# Patient Record
Sex: FEMALE | Race: BLACK OR AFRICAN AMERICAN | Employment: UNEMPLOYED | URBAN - METROPOLITAN AREA
[De-identification: names, ages, dates, MRNs, and addresses within clinical notes are randomized per-mention and may not be internally consistent; named-entity substitution may affect disease eponyms.]

---

## 2019-10-23 ENCOUNTER — OFFICE VISIT (OUTPATIENT)
Dept: PRIMARY CARE CLINIC | Age: 34
End: 2019-10-23

## 2019-10-23 VITALS
OXYGEN SATURATION: 100 % | HEIGHT: 64 IN | WEIGHT: 160.6 LBS | TEMPERATURE: 98.3 F | SYSTOLIC BLOOD PRESSURE: 106 MMHG | BODY MASS INDEX: 27.42 KG/M2 | DIASTOLIC BLOOD PRESSURE: 71 MMHG | RESPIRATION RATE: 16 BRPM | HEART RATE: 82 BPM

## 2019-10-23 DIAGNOSIS — R63.4 WEIGHT LOSS: ICD-10-CM

## 2019-10-23 DIAGNOSIS — G89.29 CHRONIC ABDOMINAL PAIN: Primary | ICD-10-CM

## 2019-10-23 DIAGNOSIS — R10.9 CHRONIC ABDOMINAL PAIN: Primary | ICD-10-CM

## 2019-10-23 DIAGNOSIS — R19.5 LOOSE STOOLS: ICD-10-CM

## 2019-10-23 DIAGNOSIS — N89.8 VAGINAL PRURITUS: ICD-10-CM

## 2019-10-23 RX ORDER — ALBUTEROL SULFATE 90 UG/1
2 AEROSOL, METERED RESPIRATORY (INHALATION)
COMMUNITY
Start: 2017-10-31

## 2019-10-23 RX ORDER — EPINEPHRINE 0.3 MG/.3ML
0.3 INJECTION SUBCUTANEOUS
COMMUNITY

## 2019-10-23 NOTE — PROGRESS NOTES
Wilmer Jones is a 36 y.o. female    Chief Complaint   Patient presents with   [address] [place] [address]    Vaginal Itching     unsure if she has a yeast infx, noticed it last week     Abdominal Pain     has been having stomach pains, diarrhea, vomiting and constipation, has lost 15lbs in the last 2 weeks, comes and goes x8mos, currently having abdominal pain and diarrhea as of last night, & it's making her anxiety get worse. 1. Have you been to the ER, urgent care clinic since your last visit? Hospitalized since your last visit? No    2. Have you seen or consulted any other health care providers outside of the [address] [place] [address] since your last visit? Include any pap smears or colon screening. No    No flowsheet data found.      Health Maintenance Due   Topic Date Due    DTaP/Tdap/Td series (1 - Tdap) 09/21/2007    PAP AKA CERVICAL CYTOLOGY  09/21/2007    Influenza Age 9 to Adult  08/01/2019

## 2019-10-23 NOTE — PATIENT INSTRUCTIONS

## 2019-10-23 NOTE — PROGRESS NOTES
HPI:     Chief Complaint   Patient presents with   [facility] Establish Care    Vaginal Itching     unsure if she has a yeast infx, noticed it last week     Abdominal Pain     has been having stomach pains, diarrhea, vomiting and constipation, has lost 15lbs in the last 2 weeks, comes and goes x8mos, currently having abdominal pain and diarrhea as of last night, & it's making her anxiety get worse. Patient is a 36 y.o. female with significant history of asthma, PCOS, depression, anxiety who presents as new patient for evaluation of abdominal pain and vaginal pruritus. Patient reports at least 8 month history of intermittent generalized abdominal pain. It is not constant but when it happens it is severe. Will last for a few days then go away. Admits to smoking marijuana daily and this often resolves her abdominal pain. Most recently had episode of abdominal pain 3 weeks ago, but did wake up with pain this morning, 7/10 in intensity. Denies nausea, vomiting, fever, chills. Pain is not associated with menses and is different than pain she has had in the past from PCOS. Pain seems to flare up when she is stressed or anxious. She has bouts of both loose stools and constipation. Reports inconsistently with stools. For instance yesterday she had BM that was hard and difficult to pass, but the day before had very loose stool. Does have BM daily. She denies bloody or dark stools and reports abdominal pain usually gets better after BM. She reports that food seems to run right through her. She will eat then have to run to the bathroom for BM. Due to this she is not eating much, has been eating one meal per day for past 8 months and snacks on crackers and ginger ale. Normal meal is chicken, solo greens, macaroni. She reports 15-20 lbs unintentional weight loss over the last 2 weeks. Denies family history of colorectal cancer or IBD.   Denies any chance of pregnancy, has issues with fertility in the past. LMP 10/13/19. Complains of vaginal itching for the past 3-4 days with clear discharge. Otherwise denies dysuria, dyspareunia, abnormal uterine bleeding. There is no problem list on file for this patient. Current Outpatient Medications   Medication Sig Dispense Refill    albuterol (PROAIR HFA) 90 mcg/actuation inhaler 2 Puffs every six (6) hours as needed.  beclomethasone (QVAR) 40 mcg/actuation aero Take 2 Puffs by inhalation daily as needed.  EPINEPHrine (EPIPEN) 0.3 mg/0.3 mL injection 0.3 mg by IntraMUSCular route once as needed. Allergies   Allergen Reactions    Red Dye Other (comments)     Throat swelling    Bee Venom Protein (Honey Bee) Other (comments)     Tongue swelling     Past Medical History:   Diagnosis Date    Anxiety     Asthma     Depression     PCOS (polycystic ovarian syndrome)      Past Surgical History:   Procedure Laterality Date    HX DILATION AND CURETTAGE      HX WISDOM TEETH EXTRACTION       Family History   Problem Relation Age of Onset    Diabetes Mother     Hypertension Mother     High Cholesterol Mother     Diabetes Father     Heart Attack Father         age 46s   Noreene Peat High Cholesterol Father     Diabetes Maternal Grandmother     Diabetes Paternal Grandmother     Thyroid Disease Neg Hx     Cancer Neg Hx     Inflammatory Bowel Dz Neg Hx     Colon Cancer Neg Hx      Social History     Tobacco Use    Smoking status: Current Every Day Smoker    Smokeless tobacco: Never Used    Tobacco comment: 1 cig a day   Substance Use Topics    Alcohol use: Yes     Comment: 2-3 glasses on the weekend          ROS:   Pertinent items are noted in HPI. Objective:     Vitals:    10/23/19 1017   BP: 106/71   Pulse: 82   Resp: 16   Temp: 98.3 °F (36.8 °C)   TempSrc: Oral   SpO2: 100%   Weight: 160 lb 9.6 oz (72.8 kg)   Height: 5' 4\" (1.626 m)        Vitals and Nurse Documentation reviewed.     Physical Examination:   General appearance - alert, well appearing, and in no distress  Mental status - alert, oriented to person, place, and time, normal mood, behavior, speech, dress, motor activity, and thought processes  Eyes - pupils equal and reactive, extraocular eye movements intact  Ears - bilateral TM's and external ear canals normal  Nose - normal and patent, no erythema, discharge or polyps  Mouth - mucous membranes moist, pharynx normal without lesions  Neck - supple, no significant adenopathy, thyroid exam: thyroid is normal in size without nodules or tenderness  Chest - clear to auscultation, no wheezes, rales or rhonchi, symmetric air entry  Heart - normal rate, regular rhythm, normal S1, S2, no murmurs, rubs, clicks or gallops  Abdomen - abdomen with diffuse mild tenderness with palpation, otherwise soft, nondistended, no guarding or rebound. No masses or organomegaly. Normal bowel sounds. Negative CVA tenderness. Pelvic - VULVA: normal appearing vulva with no masses, tenderness or lesions, VAGINA: normal appearing vagina with scant thin white discharge, CERVIX: normal appearing cervix with scant thin white discharge, UTERUS: uterus is normal size, shape, consistency and nontender, ADNEXA: normal adnexa in size, nontender and no masses, exam chaperoned by [facility]  Neurological - alert, oriented, normal speech, no focal findings or movement disorder noted  Extremities - peripheral pulses normal, no pedal edema, no clubbing or cyanosis      Assessment/ Plan:   Diagnoses and all orders for this visit:    1.  Chronic abdominal pain  -     CT ABD PELV W WO CONT; Future  -     CBC WITH AUTOMATED DIFF  -     METABOLIC PANEL, COMPREHENSIVE  -     THYROID CASCADE PROFILE  -     REFERRAL TO GASTROENTEROLOGY    2. Loose stools  -     CT ABD PELV W WO CONT; Future  -     CBC WITH AUTOMATED DIFF  -     METABOLIC PANEL, COMPREHENSIVE  -     THYROID CASCADE PROFILE  -     REFERRAL TO GASTROENTEROLOGY    3. Weight loss  -     CT ABD PELV W WO CONT; Future  -     CBC WITH AUTOMATED DIFF  -     METABOLIC PANEL, COMPREHENSIVE  -     THYROID CASCADE PROFILE  -     REFERRAL TO GASTROENTEROLOGY    4. Vaginal pruritus  -     NUSWAB VAGINITIS PLUS. Will notify of results and recommendation. Patient presents with 8 month history intermittent abdominal pain that comes and goes and associated with both loose stools and constipation. Abdominal exam with diffuse mild tenderness, but no alarm signs. Abdominal pain flares up with stress/anxiety and usually resolved with marijuana use. Suspect anxiety component, IBS. Considering duration of her symptoms and recent unintentional weight loss will proceed with CT scan. Labs as above. Would like her to see GI for further evaluation and referral was provided. Discussed healthy eating patterns and bland diet, avoid fried/fatty/spicy foods. Counseled to stop smoking cigarettes and marijuana. Follow-up and Dispositions    · Return if symptoms worsen or fail to improve. I have discussed the diagnosis with the patient and the intended plan as seen in the above orders. Advised prompt follow-up if symptoms worsen or fail to improve and symptoms that would warrant emergent evaluation in ED. The patient has received an after-visit summary and questions were answered concerning future plans. I have discussed medication side effects and warnings with the patient as well. Patient expressed understanding and is in agreement with the diagnosis and plan.

## 2019-10-24 LAB
ALBUMIN SERPL-MCNC: 4.2 G/DL (ref 3.5–5.5)
ALBUMIN/GLOB SERPL: 1.4 {RATIO} (ref 1.2–2.2)
ALP SERPL-CCNC: 47 IU/L (ref 39–117)
ALT SERPL-CCNC: 12 IU/L (ref 0–32)
AST SERPL-CCNC: 14 IU/L (ref 0–40)
BASOPHILS # BLD AUTO: 0 X10E3/UL (ref 0–0.2)
BASOPHILS NFR BLD AUTO: 1 %
BILIRUB SERPL-MCNC: 0.4 MG/DL (ref 0–1.2)
BUN SERPL-MCNC: 9 MG/DL (ref 6–20)
BUN/CREAT SERPL: 12 (ref 9–23)
CALCIUM SERPL-MCNC: 9.3 MG/DL (ref 8.7–10.2)
CHLORIDE SERPL-SCNC: 106 MMOL/L (ref 96–106)
CO2 SERPL-SCNC: 23 MMOL/L (ref 20–29)
CREAT SERPL-MCNC: 0.78 MG/DL (ref 0.57–1)
EOSINOPHIL # BLD AUTO: 0.1 X10E3/UL (ref 0–0.4)
EOSINOPHIL NFR BLD AUTO: 1 %
ERYTHROCYTE [DISTWIDTH] IN BLOOD BY AUTOMATED COUNT: 13.2 % (ref 12.3–15.4)
GLOBULIN SER CALC-MCNC: 3 G/DL (ref 1.5–4.5)
GLUCOSE SERPL-MCNC: 94 MG/DL (ref 65–99)
HCT VFR BLD AUTO: 37.9 % (ref 34–46.6)
HGB BLD-MCNC: 12.5 G/DL (ref 11.1–15.9)
IMM GRANULOCYTES # BLD AUTO: 0 X10E3/UL (ref 0–0.1)
IMM GRANULOCYTES NFR BLD AUTO: 0 %
LYMPHOCYTES # BLD AUTO: 2.3 X10E3/UL (ref 0.7–3.1)
LYMPHOCYTES NFR BLD AUTO: 42 %
MCH RBC QN AUTO: 28.4 PG (ref 26.6–33)
MCHC RBC AUTO-ENTMCNC: 33 G/DL (ref 31.5–35.7)
MCV RBC AUTO: 86 FL (ref 79–97)
MONOCYTES # BLD AUTO: 0.3 X10E3/UL (ref 0.1–0.9)
MONOCYTES NFR BLD AUTO: 5 %
NEUTROPHILS # BLD AUTO: 2.8 X10E3/UL (ref 1.4–7)
NEUTROPHILS NFR BLD AUTO: 51 %
PLATELET # BLD AUTO: 302 X10E3/UL (ref 150–450)
POTASSIUM SERPL-SCNC: 4.2 MMOL/L (ref 3.5–5.2)
PROT SERPL-MCNC: 7.2 G/DL (ref 6–8.5)
RBC # BLD AUTO: 4.4 X10E6/UL (ref 3.77–5.28)
SODIUM SERPL-SCNC: 141 MMOL/L (ref 134–144)
TSH SERPL DL<=0.005 MIU/L-ACNC: 1.4 UIU/ML (ref 0.45–4.5)
WBC # BLD AUTO: 5.4 X10E3/UL (ref 3.4–10.8)

## 2019-10-26 LAB
A VAGINAE DNA VAG QL NAA+PROBE: ABNORMAL SCORE
BVAB2 DNA VAG QL NAA+PROBE: ABNORMAL SCORE
C ALBICANS DNA VAG QL NAA+PROBE: NEGATIVE
C GLABRATA DNA VAG QL NAA+PROBE: NEGATIVE
C TRACH RRNA SPEC QL NAA+PROBE: NEGATIVE
MEGA1 DNA VAG QL NAA+PROBE: ABNORMAL SCORE
N GONORRHOEA RRNA SPEC QL NAA+PROBE: NEGATIVE
T VAGINALIS RRNA SPEC QL NAA+PROBE: NEGATIVE

## 2019-10-28 ENCOUNTER — TELEPHONE (OUTPATIENT)
Dept: PRIMARY CARE CLINIC | Age: 34
End: 2019-10-28

## 2019-10-28 DIAGNOSIS — T36.95XA ANTIBIOTIC-INDUCED YEAST INFECTION: Primary | ICD-10-CM

## 2019-10-28 DIAGNOSIS — N76.0 BV (BACTERIAL VAGINOSIS): Primary | ICD-10-CM

## 2019-10-28 DIAGNOSIS — B37.9 ANTIBIOTIC-INDUCED YEAST INFECTION: Primary | ICD-10-CM

## 2019-10-28 DIAGNOSIS — B96.89 BV (BACTERIAL VAGINOSIS): Primary | ICD-10-CM

## 2019-10-28 RX ORDER — METRONIDAZOLE 500 MG/1
500 TABLET ORAL 2 TIMES DAILY WITH MEALS
Qty: 14 TAB | Refills: 0 | Status: SHIPPED | OUTPATIENT
Start: 2019-10-28 | End: 2019-11-04

## 2019-10-28 RX ORDER — FLUCONAZOLE 150 MG/1
150 TABLET ORAL DAILY
Qty: 1 TAB | Refills: 0 | Status: SHIPPED | OUTPATIENT
Start: 2019-10-28 | End: 2019-10-29

## 2019-10-28 NOTE — TELEPHONE ENCOUNTER
Patient notified and verbalized her understanding. States that she usually gets a yeast infection after taking this and would like diflucan called into the pharmacy as well.

## 2019-10-28 NOTE — PROGRESS NOTES
Please call patient;     Vaginal swab is positive for bacterial infection. Sent a prescription of Metronidazole 500 mg BID for 7 days. Avoid any alcohol while on this medication. Few common side effect are nausea, metallic taste in mouth, epigastric discomfort, rash, dark red brown urine. Follow up if symptoms persists.

## 2019-10-28 NOTE — TELEPHONE ENCOUNTER
----- Message from Gabby Slater MD sent at 10/28/2019 12:46 PM EDT -----  Please call patient;     Vaginal swab is positive for bacterial infection. Sent a prescription of Metronidazole 500 mg BID for 7 days. Avoid any alcohol while on this medication. Few common side effect are nausea, metallic taste in mouth, epigastric discomfort, rash, dark red brown urine. Follow up if symptoms persists.

## 2020-12-10 ENCOUNTER — HOSPITAL ENCOUNTER (EMERGENCY)
Age: 35
Discharge: HOME | End: 2020-12-11
Attending: EMERGENCY MEDICINE

## 2020-12-10 ENCOUNTER — APPOINTMENT (OUTPATIENT)
Dept: ULTRASOUND IMAGING | Age: 35
End: 2020-12-10
Attending: PHYSICIAN ASSISTANT

## 2020-12-10 VITALS
HEIGHT: 64 IN | SYSTOLIC BLOOD PRESSURE: 114 MMHG | HEART RATE: 97 BPM | TEMPERATURE: 97.5 F | WEIGHT: 158.29 LBS | RESPIRATION RATE: 20 BRPM | OXYGEN SATURATION: 100 % | BODY MASS INDEX: 27.02 KG/M2 | DIASTOLIC BLOOD PRESSURE: 70 MMHG

## 2020-12-10 DIAGNOSIS — O26.891 ABDOMINAL PAIN DURING PREGNANCY IN FIRST TRIMESTER: Primary | ICD-10-CM

## 2020-12-10 DIAGNOSIS — R10.9 ABDOMINAL PAIN DURING PREGNANCY IN FIRST TRIMESTER: Primary | ICD-10-CM

## 2020-12-10 DIAGNOSIS — Z34.90 EARLY STAGE OF PREGNANCY: ICD-10-CM

## 2020-12-10 LAB
ALBUMIN SERPL-MCNC: 3.8 G/DL (ref 3.4–5)
ALBUMIN/GLOB SERPL: 1 {RATIO} (ref 0.8–1.7)
ALP SERPL-CCNC: 56 U/L (ref 45–117)
ALT SERPL-CCNC: 29 U/L (ref 13–56)
ANION GAP SERPL CALC-SCNC: 7 MMOL/L (ref 3–18)
APPEARANCE UR: CLEAR
AST SERPL-CCNC: 15 U/L (ref 10–38)
BACTERIA URNS QL MICRO: ABNORMAL /HPF
BASOPHILS # BLD: 0 K/UL (ref 0–0.1)
BASOPHILS NFR BLD: 0 % (ref 0–2)
BILIRUB SERPL-MCNC: 0.5 MG/DL (ref 0.2–1)
BILIRUB UR QL: NEGATIVE
BUN SERPL-MCNC: 10 MG/DL (ref 7–18)
BUN/CREAT SERPL: 11 (ref 12–20)
CALCIUM SERPL-MCNC: 9.3 MG/DL (ref 8.5–10.1)
CHLORIDE SERPL-SCNC: 108 MMOL/L (ref 100–111)
CO2 SERPL-SCNC: 25 MMOL/L (ref 21–32)
COLOR UR: YELLOW
CREAT SERPL-MCNC: 0.88 MG/DL (ref 0.6–1.3)
DIFFERENTIAL METHOD BLD: NORMAL
EOSINOPHIL # BLD: 0 K/UL (ref 0–0.4)
EOSINOPHIL NFR BLD: 0 % (ref 0–5)
EPITH CASTS URNS QL MICRO: ABNORMAL /LPF (ref 0–5)
ERYTHROCYTE [DISTWIDTH] IN BLOOD BY AUTOMATED COUNT: 13.9 % (ref 11.6–14.5)
GLOBULIN SER CALC-MCNC: 4 G/DL (ref 2–4)
GLUCOSE SERPL-MCNC: 96 MG/DL (ref 74–99)
GLUCOSE UR STRIP.AUTO-MCNC: NEGATIVE MG/DL
HCG SERPL-ACNC: 5556 MIU/ML (ref 0–10)
HCG UR QL: POSITIVE
HCT VFR BLD AUTO: 38.2 % (ref 35–45)
HGB BLD-MCNC: 13.3 G/DL (ref 12–16)
HGB UR QL STRIP: ABNORMAL
KETONES UR QL STRIP.AUTO: ABNORMAL MG/DL
LEUKOCYTE ESTERASE UR QL STRIP.AUTO: NEGATIVE
LIPASE SERPL-CCNC: 124 U/L (ref 73–393)
LYMPHOCYTES # BLD: 3.4 K/UL (ref 0.9–3.6)
LYMPHOCYTES NFR BLD: 31 % (ref 21–52)
MCH RBC QN AUTO: 30 PG (ref 24–34)
MCHC RBC AUTO-ENTMCNC: 34.8 G/DL (ref 31–37)
MCV RBC AUTO: 86.2 FL (ref 74–97)
MONOCYTES # BLD: 0.7 K/UL (ref 0.05–1.2)
MONOCYTES NFR BLD: 6 % (ref 3–10)
MUCOUS THREADS URNS QL MICRO: ABNORMAL /LPF
NEUTS SEG # BLD: 7 K/UL (ref 1.8–8)
NEUTS SEG NFR BLD: 63 % (ref 40–73)
NITRITE UR QL STRIP.AUTO: NEGATIVE
PH UR STRIP: 6 [PH] (ref 5–8)
PLATELET # BLD AUTO: 341 K/UL (ref 135–420)
PMV BLD AUTO: 11 FL (ref 9.2–11.8)
POTASSIUM SERPL-SCNC: 3.5 MMOL/L (ref 3.5–5.5)
PROT SERPL-MCNC: 7.8 G/DL (ref 6.4–8.2)
PROT UR STRIP-MCNC: NEGATIVE MG/DL
RBC # BLD AUTO: 4.43 M/UL (ref 4.2–5.3)
RBC #/AREA URNS HPF: ABNORMAL /HPF (ref 0–5)
SODIUM SERPL-SCNC: 140 MMOL/L (ref 136–145)
SP GR UR REFRACTOMETRY: 1.03 (ref 1–1.03)
UROBILINOGEN UR QL STRIP.AUTO: 1 EU/DL (ref 0.2–1)
WBC # BLD AUTO: 11.1 K/UL (ref 4.6–13.2)
WBC URNS QL MICRO: ABNORMAL /HPF (ref 0–5)

## 2020-12-10 PROCEDURE — 83690 ASSAY OF LIPASE: CPT

## 2020-12-10 PROCEDURE — 84702 CHORIONIC GONADOTROPIN TEST: CPT

## 2020-12-10 PROCEDURE — 96360 HYDRATION IV INFUSION INIT: CPT

## 2020-12-10 PROCEDURE — 74011250636 HC RX REV CODE- 250/636: Performed by: PHYSICIAN ASSISTANT

## 2020-12-10 PROCEDURE — 96375 TX/PRO/DX INJ NEW DRUG ADDON: CPT

## 2020-12-10 PROCEDURE — 85025 COMPLETE CBC W/AUTO DIFF WBC: CPT

## 2020-12-10 PROCEDURE — 76817 TRANSVAGINAL US OBSTETRIC: CPT

## 2020-12-10 PROCEDURE — 81025 URINE PREGNANCY TEST: CPT

## 2020-12-10 PROCEDURE — C9113 INJ PANTOPRAZOLE SODIUM, VIA: HCPCS | Performed by: PHYSICIAN ASSISTANT

## 2020-12-10 PROCEDURE — 80053 COMPREHEN METABOLIC PANEL: CPT

## 2020-12-10 PROCEDURE — 81001 URINALYSIS AUTO W/SCOPE: CPT

## 2020-12-10 PROCEDURE — 99283 EMERGENCY DEPT VISIT LOW MDM: CPT

## 2020-12-10 RX ORDER — PANTOPRAZOLE SODIUM 40 MG/10ML
40 INJECTION, POWDER, LYOPHILIZED, FOR SOLUTION INTRAVENOUS
Status: COMPLETED | OUTPATIENT
Start: 2020-12-10 | End: 2020-12-10

## 2020-12-10 RX ORDER — ONDANSETRON 2 MG/ML
4 INJECTION INTRAMUSCULAR; INTRAVENOUS
Status: COMPLETED | OUTPATIENT
Start: 2020-12-10 | End: 2020-12-10

## 2020-12-10 RX ADMIN — ONDANSETRON 4 MG: 2 INJECTION INTRAMUSCULAR; INTRAVENOUS at 21:30

## 2020-12-10 RX ADMIN — SODIUM CHLORIDE 1000 ML: 900 INJECTION, SOLUTION INTRAVENOUS at 21:30

## 2020-12-10 RX ADMIN — PANTOPRAZOLE SODIUM 40 MG: 40 INJECTION, POWDER, FOR SOLUTION INTRAVENOUS at 21:30

## 2020-12-11 NOTE — DISCHARGE INSTRUCTIONS
Patient Education     Belly Pain in Pregnancy: Care Instructions  Your Care Instructions  When you're pregnant, any belly pain can be a worry. You may not want to call your doctor about every pain you have. But you don't want to miss something that is dangerous for you or your baby. Even if it feels familiar, belly pain can mean something new when you're pregnant. It's important to know when to call your doctor. It will also help to know how to care for yourself at home when your pain is not caused by anything harmful. · When belly pain is more severe or constant, see a doctor right away. · If you're sure your belly pain is a sign of labor, call your doctor. · When belly pain is brief, it's usually a normal part of pregnancy. It might be related to changes in the growing uterus. Or it could be the stretching of ligaments called round ligaments. These ligaments help support the uterus. Round ligament pain can be on either side of your belly. It can also be felt in your hips or groin. Follow-up care is a key part of your treatment and safety. Be sure to make and go to all appointments, and call your doctor if you are having problems. It's also a good idea to know your test results and keep a list of the medicines you take. How can you tell if belly pain is a sign of labor? When belly pain is caused by labor, it can feel like mild or menstrual-like cramps in your lower belly. These cramps are probably contractions. They can happen in your second or third trimester. You may also have:  · A steady, dull ache in your lower back, pelvis, or thighs. · A feeling of pressure in your pelvis or lower belly. · Changes in your vaginal discharge or a sudden release of fluid from the vagina. If you think you are in labor, call your doctor. How can you care for yourself at home? When belly pain is mild and is not a symptom of labor:  · Rest until you feel better. · Take a warm bath.   · Think about what you drink and eat:  ¨ Drink plenty of fluids. Choose water and other caffeine-free clear liquids until you feel better. ¨ Try eating small, frequent meals. If your stomach is upset, try bland, low-fat foods like plain rice, broiled chicken, toast, and yogurt. · Think about how you move if you are having brief pains from stretching of the round ligaments. ¨ Try gentle stretching. ¨ Move a little more slowly when turning in bed or getting up from a chair, so those ligaments don't stretch quickly. ¨ Lean forward a bit if you think you are going to cough or sneeze. When should you call for help? Call 911 anytime you think you may need emergency care. For example, call if:  · You have sudden, severe pain in your belly. · You have severe vaginal bleeding. Call your doctor now or seek immediate medical care if:  · You have new or worse belly pain or cramping. · You have any vaginal bleeding. · You have a fever. · You have symptoms of preeclampsia, such as:  ¨ Sudden swelling of your face, hands, or feet. ¨ New vision problems (such as dimness or blurring). ¨ A severe headache. · You think that you may be in labor. This means that you've had at least 8 contractions within 1 hour or at least 4 contractions within 20 minutes, even after you change your position and drink fluids. · You have symptoms of a urinary tract infection. These may include:  ¨ Pain or burning when you urinate. ¨ A frequent need to urinate without being able to pass much urine. ¨ Pain in the flank, which is just below the rib cage and above the waist on either side of the back. ¨ Blood in your urine. Watch closely for changes in your health, and be sure to contact your doctor if you are worried about your or your baby's health. Where can you learn more? Go to [employer]ervation[employer]  Enter B275 in the search box to learn more about \"Belly Pain in Pregnancy: Care Instructions. \"   © 0693-9985 [employer].  Care instructions adapted under license by [employer] (which disclaims liability or warranty for this information). This care instruction is for use with your licensed healthcare professional. If you have questions about a medical condition or this instruction, always ask your healthcare professional. [address] any warranty or liability for your use of this information. Content Version: 38.3.700195; Current as of: November 12, 2015           Patient Education        Belly Pain in Pregnancy: Care Instructions  Your Care Instructions     When you're pregnant, any belly pain can be a worry. You may not want to call your doctor about every pain you have. But you don't want to miss something that is dangerous for you or your baby. Even if it feels familiar, belly pain can mean something new when you're pregnant. It's important to know when to call your doctor. It will also help to know how to care for yourself at home when your pain is not caused by anything harmful. · When belly pain is more severe or constant, see a doctor right away. · If you're sure your belly pain is a sign of labor, call your doctor. · When belly pain is brief, it's usually a normal part of pregnancy. It might be related to changes in the growing uterus. Or it could be the stretching of ligaments called round ligaments. These ligaments help support the uterus. Round ligament pain can be on either side of your belly. It can also be felt in your hips or groin. Follow-up care is a key part of your treatment and safety. Be sure to make and go to all appointments, and call your doctor if you are having problems. It's also a good idea to know your test results and keep a list of the medicines you take. How can you tell if belly pain is a sign of labor? When belly pain is caused by labor, it can feel like mild or menstrual-like cramps in your lower belly. These cramps are probably contractions.  They can happen in your second or third trimester. You may also have:  · A steady, dull ache in your lower back, pelvis, or thighs. · A feeling of pressure in your pelvis or lower belly. · Changes in your vaginal discharge or a sudden release of fluid from the vagina. If you think you are in labor, call your doctor. How can you care for yourself at home? When belly pain is mild and is not a symptom of labor:  · Rest until you feel better. · Take a warm bath. · Think about what you drink and eat:  ? Drink plenty of fluids. Choose water and other caffeine-free clear liquids until you feel better. ? Try eating small, frequent meals. If your stomach is upset, try bland, low-fat foods like plain rice, broiled chicken, toast, and yogurt. · Think about how you move if you are having brief pains from stretching of the round ligaments. ? Try gentle stretching. ? Move a little more slowly when turning in bed or getting up from a chair, so those ligaments don't stretch quickly. ? Lean forward a bit if you think you are going to cough or sneeze. When should you call for help? Call 911 anytime you think you may need emergency care. For example, call if:    · You have sudden, severe pain in your belly.     · You have severe vaginal bleeding.     · You passed out (lost consciousness).     · You have a seizure. Call your doctor now or seek immediate medical care if:    · You have new or worse belly pain or cramping.     · You have any vaginal bleeding.     · You have a fever.     · You have symptoms of preeclampsia, such as:  ? Sudden swelling of your face, hands, or feet. ? New vision problems (such as dimness, blurring, or seeing spots). ? A severe headache.     · You think that you may be in labor. This means that you've had at least 8 contractions within 1 hour or at least 4 contractions within 20 minutes, even after you change your position and drink fluids.     · You have symptoms of a urinary tract infection.  These may include:  ? Pain or burning when you urinate. ? A frequent need to urinate without being able to pass much urine. ? Pain in the flank, which is just below the rib cage and above the waist on either side of the back. ? Blood in your urine. Watch closely for changes in your health, and be sure to contact your doctor if you are worried about your or your baby's health. Where can you learn more? Go to http://www.rush.com/  Enter B275 in the search box to learn more about \"Belly Pain in Pregnancy: Care Instructions. \"  Current as of: February 11, 2020               Content Version: 12.6  © 8827-3778 [employer]. Care instructions adapted under license by [employer] (which disclaims liability or warranty for this information). If you have questions about a medical condition or this instruction, always ask your healthcare professional. [address] any warranty or liability for your use of this information.

## 2020-12-11 NOTE — ED PROVIDER NOTES
EMERGENCY DEPARTMENT HISTORY AND PHYSICAL EXAM    Date: 12/10/2020  Patient Name: Bethel Jean    History of Presenting Illness     Chief Complaint   Patient presents with    Shortness of Breath    Abdominal Pain         History Provided By: Patient    Bethel Jean is a [address] y.o. female with PMHX of IBS & PCOS who presents to the emergency department C/O upper abdominal pain. Associated sxs include sided abdominal pain nausea, positive UPT at home today. Reports 2 days of intermittent upper abdominal pain and also left-sided abdominal pain. Patient states that her pain feels similar to her IBS type pains. However patient states that she got a positive home UPT which prompted ER visit. Patient states she has a history of PCOS and was told that she would never be able to have children. So this came to her as a surprise. Last menstrual cycle early November. Pt denies vomiting, fever, vaginal bleeding, dysuria, pelvic or lower abdominal pain, and any other sxs or complaints. PCP: None    Current Outpatient Medications   Medication Sig Dispense Refill    albuterol (PROAIR HFA) 90 mcg/actuation inhaler 2 Puffs every six (6) hours as needed.  beclomethasone (QVAR) 40 mcg/actuation aero Take 2 Puffs by inhalation daily as needed.  EPINEPHrine (EPIPEN) 0.3 mg/0.3 mL injection 0.3 mg by IntraMUSCular route once as needed.          Past History     Past Medical History:  Past Medical History:   Diagnosis Date    Anxiety     Asthma     Depression     PCOS (polycystic ovarian syndrome)        Past Surgical History:  Past Surgical History:   Procedure Laterality Date    HX DILATION AND CURETTAGE      HX WISDOM TEETH EXTRACTION         Family History:  Family History   Problem Relation Age of Onset    Diabetes Mother     Hypertension Mother     High Cholesterol Mother     Diabetes Father     Heart Attack Father         age 46s    High Cholesterol Father     Diabetes Maternal Grandmother  Diabetes Paternal Grandmother     Thyroid Disease Neg Hx     Cancer Neg Hx     Inflammatory Bowel Dz Neg Hx     Colon Cancer Neg Hx        Social History:  Social History     Tobacco Use    Smoking status: Current Every Day Smoker    Smokeless tobacco: Never Used    Tobacco comment: 1 cig a day   Substance Use Topics    Alcohol use: Yes     Comment: 2-3 glasses on the weekend    Drug use: Yes     Types: Marijuana     Comment: every night        Allergies: Allergies   Allergen Reactions    Red Dye Other (comments)     Throat swelling    Bee Venom Protein (Honey Bee) Other (comments)     Tongue swelling         Review of Systems   Review of Systems   Constitutional: Negative for fever. HENT: Negative for congestion. Respiratory: Negative for cough. Cardiovascular: Negative for chest pain. Gastrointestinal: Positive for abdominal pain and nausea. Negative for blood in stool, constipation, diarrhea and vomiting. Genitourinary: Negative for dysuria, hematuria, pelvic pain and vaginal bleeding. All other systems reviewed and are negative. Physical Exam     Vitals:    12/10/20 2017   BP: 114/70   Pulse: 97   Resp: 20   Temp: 97.5 °F (36.4 °C)   SpO2: 100%   Weight: 71.8 kg (158 lb 4.6 oz)   Height: 5' 4\" (1.626 m)     Physical Exam  Vitals signs and nursing note reviewed. Constitutional:       General: She is not in acute distress. Appearance: She is well-developed and normal weight. She is not ill-appearing. Comments: Anxious appearing, tearful at times   HENT:      Head: Normocephalic and atraumatic. Eyes:      Extraocular Movements: Extraocular movements intact. Pupils: Pupils are equal, round, and reactive to light. Neck:      Musculoskeletal: Neck supple. Cardiovascular:      Rate and Rhythm: Normal rate. Pulmonary:      Effort: Pulmonary effort is normal.   Abdominal:      Palpations: Abdomen is soft. Tenderness: There is no abdominal tenderness. Musculoskeletal: Normal range of motion. Skin:     General: Skin is warm and dry. Capillary Refill: Capillary refill takes less than 2 seconds. Neurological:      General: No focal deficit present. Mental Status: She is alert and oriented to person, place, and time. Psychiatric:         Mood and Affect: Mood normal.         Behavior: Behavior normal.           Diagnostic Study Results     Labs -     Recent Results (from the past 12 hour(s))   HCG URINE, QL    Collection Time: 12/10/20  8:30 PM   Result Value Ref Range    HCG urine, QL Positive (A) NEG     URINALYSIS W/ RFLX MICROSCOPIC    Collection Time: 12/10/20  8:30 PM   Result Value Ref Range    Color YELLOW      Appearance CLEAR      Specific gravity 1.029 1.005 - 1.030      pH (UA) 6.0 5.0 - 8.0      Protein Negative NEG mg/dL    Glucose Negative NEG mg/dL    Ketone TRACE (A) NEG mg/dL    Bilirubin Negative NEG      Blood TRACE (A) NEG      Urobilinogen 1.0 0.2 - 1.0 EU/dL    Nitrites Negative NEG      Leukocyte Esterase Negative NEG     URINE MICROSCOPIC ONLY    Collection Time: 12/10/20  8:30 PM   Result Value Ref Range    WBC 0 to 3 0 - 5 /hpf    RBC 0 to 3 0 - 5 /hpf    Epithelial cells FEW 0 - 5 /lpf    Bacteria 1+ (A) NEG /hpf    Mucus 1+ (A) NEG /lpf   CBC WITH AUTOMATED DIFF    Collection Time: 12/10/20  8:43 PM   Result Value Ref Range    WBC 11.1 4.6 - 13.2 K/uL    RBC 4.43 4.20 - 5.30 M/uL    HGB 13.3 12.0 - 16.0 g/dL    HCT 38.2 35.0 - 45.0 %    MCV 86.2 74.0 - 97.0 FL    MCH 30.0 24.0 - 34.0 PG    MCHC 34.8 31.0 - 37.0 g/dL    RDW 13.9 11.6 - 14.5 %    PLATELET 780 144 - 434 K/uL    MPV 11.0 9.2 - 11.8 FL    NEUTROPHILS 63 40 - 73 %    LYMPHOCYTES 31 21 - 52 %    MONOCYTES 6 3 - 10 %    EOSINOPHILS 0 0 - 5 %    BASOPHILS 0 0 - 2 %    ABS. NEUTROPHILS 7.0 1.8 - 8.0 K/UL    ABS. LYMPHOCYTES 3.4 0.9 - 3.6 K/UL    ABS. MONOCYTES 0.7 0.05 - 1.2 K/UL    ABS. EOSINOPHILS 0.0 0.0 - 0.4 K/UL    ABS.  BASOPHILS 0.0 0.0 - 0.1 K/UL    DF AUTOMATED     METABOLIC PANEL, COMPREHENSIVE    Collection Time: 12/10/20  8:43 PM   Result Value Ref Range    Sodium 140 136 - 145 mmol/L    Potassium 3.5 3.5 - 5.5 mmol/L    Chloride 108 100 - 111 mmol/L    CO2 25 21 - 32 mmol/L    Anion gap 7 3.0 - 18 mmol/L    Glucose 96 74 - 99 mg/dL    BUN 10 7.0 - 18 MG/DL    Creatinine 0.88 0.6 - 1.3 MG/DL    BUN/Creatinine ratio 11 (L) 12 - 20      GFR est AA >60 >60 ml/min/1.73m2    GFR est non-AA >60 >60 ml/min/1.73m2    Calcium 9.3 8.5 - 10.1 MG/DL    Bilirubin, total 0.5 0.2 - 1.0 MG/DL    ALT (SGPT) 29 13 - 56 U/L    AST (SGOT) 15 10 - 38 U/L    Alk. phosphatase 56 45 - 117 U/L    Protein, total 7.8 6.4 - 8.2 g/dL    Albumin 3.8 3.4 - 5.0 g/dL    Globulin 4.0 2.0 - 4.0 g/dL    A-G Ratio 1.0 0.8 - 1.7     LIPASE    Collection Time: 12/10/20  8:43 PM   Result Value Ref Range    Lipase 124 73 - 393 U/L   BETA HCG, QT    Collection Time: 12/10/20  8:43 PM   Result Value Ref Range    Beta HCG, QT 5,556 (H) 0 - 10 MIU/ML       Radiologic Studies -   US OB TV W DOPPLER   Final Result   Impression:   --------------      Intrauterine gestational sac and yolk sac. No fetal pole currently identified. Findings suggests early gestation. Consider follow-up. CT Results  (Last 48 hours)    None        CXR Results  (Last 48 hours)    None          Medications given in the ED-  Medications   ondansetron (ZOFRAN) injection 4 mg (4 mg IntraVENous Given 12/10/20 2130)   pantoprazole (PROTONIX) injection 40 mg (40 mg IntraVENous Given 12/10/20 2130)   sodium chloride 0.9 % bolus infusion 1,000 mL (0 mL IntraVENous IV Completed 12/10/20 2230)         Medical Decision Making   I am the first provider for this patient. I reviewed the vital signs, available nursing notes, past medical history, past surgical history, family history and social history. Vital Signs-Reviewed the patient's vital signs.     Pulse Oximetry Analysis - 100% on RA     Records Reviewed: Nursing Notes    Procedures:  Procedures    ED Course:   9:36 PM   Initial assessment performed. The patients presenting problems have been discussed, and they are in agreement with the care plan formulated and outlined with them. I have encouraged them to ask questions as they arise throughout their visit. 10:46 PM  Better after IV fluids Protonix and antiemetics. She states pain completely resolved. Did review positive UPT and beta hCG over 5000. Will move forward with ultrasound. Discussion: 30 y.o. female history of IBS complaining of 2 days of upper abdominal pain with nausea getting a home positive UPT test.  She is afebrile she is appropriate vital signs laboratory findings remarkable for normal white blood cell counts, negative UA, hCG over 5000 and ultrasound with IUP too early for fetal heart rates. No vaginal bleeding no fevers. Plan for prenatals clear liquids OB/GYN follow-up. And strict return precautions discussed. Diagnosis and Disposition       DISCHARGE NOTE:  Roxann Dooley  results have been reviewed with her. She has been counseled regarding her diagnosis, treatment, and plan. She verbally conveys understanding and agreement of the signs, symptoms, diagnosis, treatment and prognosis and additionally agrees to follow up as discussed. She also agrees with the care-plan and conveys that all of her questions have been answered. I have also provided discharge instructions for her that include: educational information regarding their diagnosis and treatment, and list of reasons why they would want to return to the ED prior to their follow-up appointment, should her condition change. She has been provided with education for proper emergency department utilization. CLINICAL IMPRESSION:    1. Abdominal pain during pregnancy in first trimester    2. Early stage of pregnancy        PLAN:  1. D/C Home  2. Current Discharge Medication List        3.    Follow-up Information Follow up With Specialties Details Why Contact Info    your OB/GYN  Schedule an appointment as soon as possible for a visit      Jet Walker, DO Obstetrics & Gynecology Schedule an appointment as soon as possible for a visit  [facility] [address] [place] [id number] [phone]      [facility] EMERGENCY DEPT Emergency Medicine  As needed, If symptoms worsen [address] [id number]  [phone]                  Please note that this dictation was completed with [employer], the computer voice recognition software. Quite often unanticipated grammatical, syntax, homophones, and other interpretive errors are inadvertently transcribed by the computer software. Please disregard these errors. Please excuse any errors that have escaped final proofreading.

## 2020-12-11 NOTE — ED TRIAGE NOTES
Pt c/o left lateral abd pain, states had a positive home pregnancy test. Also c/o sob that started today. Respirations unlabored and even. Speaking full sentences. breath sounds clear and equal bilaterally